# Patient Record
Sex: FEMALE | Race: BLACK OR AFRICAN AMERICAN | NOT HISPANIC OR LATINO | Employment: UNEMPLOYED | URBAN - METROPOLITAN AREA
[De-identification: names, ages, dates, MRNs, and addresses within clinical notes are randomized per-mention and may not be internally consistent; named-entity substitution may affect disease eponyms.]

---

## 2024-03-07 ENCOUNTER — HOSPITAL ENCOUNTER (EMERGENCY)
Facility: CLINIC | Age: 44
Discharge: HOME | End: 2024-03-07
Attending: EMERGENCY MEDICINE | Admitting: EMERGENCY MEDICINE
Payer: COMMERCIAL

## 2024-03-07 VITALS
BODY MASS INDEX: 41.04 KG/M2 | RESPIRATION RATE: 16 BRPM | WEIGHT: 223 LBS | OXYGEN SATURATION: 98 % | DIASTOLIC BLOOD PRESSURE: 82 MMHG | TEMPERATURE: 98.8 F | HEART RATE: 98 BPM | SYSTOLIC BLOOD PRESSURE: 123 MMHG | HEIGHT: 62 IN

## 2024-03-07 DIAGNOSIS — J02.0 STREP PHARYNGITIS: ICD-10-CM

## 2024-03-07 LAB
FLUAV RNA SPEC QL NAA+PROBE: NEGATIVE
FLUBV RNA RESP QL NAA+PROBE: NEGATIVE
GROUP A STREP BY PCR: DETECTED
RSV RNA SPEC NAA+PROBE: NEGATIVE
SARS-COV-2 RNA RESP QL NAA+PROBE: NEGATIVE

## 2024-03-07 PROCEDURE — 87651 STREP A DNA AMP PROBE: CPT | Performed by: EMERGENCY MEDICINE

## 2024-03-07 PROCEDURE — 250N000013 HC RX MED GY IP 250 OP 250 PS 637: Performed by: EMERGENCY MEDICINE

## 2024-03-07 PROCEDURE — 250N000012 HC RX MED GY IP 250 OP 636 PS 637: Performed by: EMERGENCY MEDICINE

## 2024-03-07 PROCEDURE — 99283 EMERGENCY DEPT VISIT LOW MDM: CPT

## 2024-03-07 PROCEDURE — 87637 SARSCOV2&INF A&B&RSV AMP PRB: CPT | Performed by: EMERGENCY MEDICINE

## 2024-03-07 RX ORDER — PENICILLIN V POTASSIUM 500 MG/1
500 TABLET, FILM COATED ORAL 2 TIMES DAILY
Qty: 20 TABLET | Refills: 0 | Status: SHIPPED | OUTPATIENT
Start: 2024-03-07 | End: 2024-03-17

## 2024-03-07 RX ORDER — IBUPROFEN 600 MG/1
600 TABLET, FILM COATED ORAL ONCE
Status: COMPLETED | OUTPATIENT
Start: 2024-03-07 | End: 2024-03-07

## 2024-03-07 RX ADMIN — IBUPROFEN 600 MG: 600 TABLET ORAL at 05:43

## 2024-03-07 RX ADMIN — DEXAMETHASONE 10 MG: 2 TABLET ORAL at 05:43

## 2024-03-07 ASSESSMENT — ACTIVITIES OF DAILY LIVING (ADL)
ADLS_ACUITY_SCORE: 33
ADLS_ACUITY_SCORE: 33

## 2024-03-07 ASSESSMENT — COLUMBIA-SUICIDE SEVERITY RATING SCALE - C-SSRS
2. HAVE YOU ACTUALLY HAD ANY THOUGHTS OF KILLING YOURSELF IN THE PAST MONTH?: NO
1. IN THE PAST MONTH, HAVE YOU WISHED YOU WERE DEAD OR WISHED YOU COULD GO TO SLEEP AND NOT WAKE UP?: NO
6. HAVE YOU EVER DONE ANYTHING, STARTED TO DO ANYTHING, OR PREPARED TO DO ANYTHING TO END YOUR LIFE?: NO

## 2024-03-07 NOTE — ED PROVIDER NOTES
"  History     Chief Complaint:  Throat Pain       HPI   Yesy Cheng is a 43 year old female who presents to the ED with a 2-day history of sore throat.  Per her report nobody is currently sick at home.  The patient has had a slight runny nose.  No significant cough.  No body ache or chills.  No skin rash.  She complains of pain with swallowing.      Independent Historian:   None    Review of External Notes:  None    Allergies:  No Known Allergies     Listed Medications:    penicillin V (VEETID) 500 MG tablet        Past Medical and Surgical History:    No past medical history on file.  No past surgical history on file.     Family History:    family history is not on file.    Social History:         Physical Exam   Patient Vitals for the past 24 hrs:   BP Temp Temp src Pulse Resp SpO2 Height Weight   03/07/24 0315 123/82 98.8  F (37.1  C) Oral 98 16 98 % 1.575 m (5' 2\") 101.2 kg (223 lb)      General: Resting on the gurney  Head:  The scalp, face, and head appear normal  Eyes:  The pupils are equal, round, and reactive to light    There is no nystagmus    Extraocular muscles are intact    Conjunctivae and sclerae are normal  ENT:    The nose is normal    Pinnae are normal    There is erythema involving the palatoglossal arches    There are bilateral exudates involving the tonsils    The Uvula is noted to be in the midline    There is no evidence of Peritonsillar Abscess    There is no Aly's Angina.  The sublingual space is normal  Neck:  Normal range of motion    There is no nuchal rigidity noted    There is no midline cervical spine pain/tenderness  Skin:  No rash or acute skin lesions noted  Neuro:  Speech is normal and fluent.  Lymph: There is mild anterior cervical lymphadenopathy noted    There is no posterior cervical lymphadenopathy          Emergency Department Course   E       Reports in this section have been read by the radiologist.    Laboratory:  Labs Ordered and Resulted from Time of ED Arrival " to Time of ED Departure   GROUP A STREPTOCOCCUS PCR THROAT SWAB - Abnormal       Result Value    Group A strep by PCR Detected (*)    INFLUENZA A/B, RSV, & SARS-COV2 PCR - Normal    Influenza A PCR Negative      Influenza B PCR Negative      RSV PCR Negative      SARS CoV2 PCR Negative          Procedures:  Procedures       Emergency Department Course & Assessments:             Interventions/ED Medications:  Medications   dexAMETHasone (DECADRON) tablet 10 mg (has no administration in time range)   ibuprofen (ADVIL/MOTRIN) tablet 600 mg (has no administration in time range)          Independent Interpretation of Radiology Studies by Dr. Matute      Assessments/Consultations/Discussion of Management:         Disposition:  Home    Impression & Plan        Medical Decision Making:  This patient presents to the emergency department with a 2-day history of sore throat.  She had viral PCR testing for COVID, influenza, and RSV which was negative.  Strep testing was positive in the emergency department.  Patient does have a bilateral exudative tonsillopharyngitis.  Uvula is in the midline and there is no evidence of peritonsillar abscess or other suppurative complications at this time.  Patient is given a single dose of dexamethasone to help with the inflammation and pain, she will be placed on penicillin for 10 days.      Diagnosis:    ICD-10-CM    1. Strep pharyngitis  J02.0              Discharge Medications (if applicable):  New Prescriptions    PENICILLIN V (VEETID) 500 MG TABLET    Take 1 tablet (500 mg) by mouth 2 times daily for 10 days          Cash Matute MD  3/7/2024   Cash Matute MD Rock, Michael P, MD  03/07/24 0565

## 2024-03-07 NOTE — DISCHARGE INSTRUCTIONS
Take your penicillin twice a day for 10 days  Take over-the-counter ibuprofen 4 to 600 mg (2 to 3 tablets) 3 times a day with food to help with pain  May also take extra strength Tylenol 1000 mg every 6 hours as needed for pain

## 2024-03-07 NOTE — ED TRIAGE NOTES
Patient states sore throat; started a couple of days ago.  Tonight it is more painful.  Fever, chills.  Has not used a thermometer.   Unable to sleep tonight due to pain.

## 2025-04-02 ENCOUNTER — HOSPITAL ENCOUNTER (EMERGENCY)
Facility: CLINIC | Age: 45
Discharge: HOME | End: 2025-04-02
Attending: EMERGENCY MEDICINE | Admitting: EMERGENCY MEDICINE
Payer: COMMERCIAL

## 2025-04-02 VITALS
TEMPERATURE: 98 F | BODY MASS INDEX: 33.61 KG/M2 | SYSTOLIC BLOOD PRESSURE: 114 MMHG | OXYGEN SATURATION: 99 % | HEART RATE: 70 BPM | HEIGHT: 61 IN | DIASTOLIC BLOOD PRESSURE: 83 MMHG | WEIGHT: 178 LBS | RESPIRATION RATE: 15 BRPM

## 2025-04-02 DIAGNOSIS — R07.9 ACUTE CHEST PAIN: ICD-10-CM

## 2025-04-02 DIAGNOSIS — S29.012A STRAIN OF THORACIC BACK REGION: ICD-10-CM

## 2025-04-02 LAB
ALBUMIN SERPL BCG-MCNC: 4.2 G/DL (ref 3.5–5.2)
ALP SERPL-CCNC: 64 U/L (ref 40–150)
ALT SERPL W P-5'-P-CCNC: 11 U/L (ref 0–50)
ANION GAP SERPL CALCULATED.3IONS-SCNC: 12 MMOL/L (ref 7–15)
AST SERPL W P-5'-P-CCNC: 18 U/L (ref 0–45)
ATRIAL RATE - MUSE: 88 BPM
BASOPHILS # BLD AUTO: 0 10E3/UL (ref 0–0.2)
BASOPHILS NFR BLD AUTO: 0 %
BILIRUB SERPL-MCNC: 0.2 MG/DL
BUN SERPL-MCNC: 12.3 MG/DL (ref 6–20)
CALCIUM SERPL-MCNC: 9.2 MG/DL (ref 8.8–10.4)
CHLORIDE SERPL-SCNC: 101 MMOL/L (ref 98–107)
CREAT SERPL-MCNC: 0.71 MG/DL (ref 0.51–0.95)
D DIMER PPP FEU-MCNC: 0.33 UG/ML FEU (ref 0–0.5)
DIASTOLIC BLOOD PRESSURE - MUSE: NORMAL MMHG
EGFRCR SERPLBLD CKD-EPI 2021: >90 ML/MIN/1.73M2
EOSINOPHIL # BLD AUTO: 0.2 10E3/UL (ref 0–0.7)
EOSINOPHIL NFR BLD AUTO: 4 %
ERYTHROCYTE [DISTWIDTH] IN BLOOD BY AUTOMATED COUNT: 17.2 % (ref 10–15)
GLUCOSE SERPL-MCNC: 81 MG/DL (ref 70–99)
HCG SERPL QL: NEGATIVE
HCO3 SERPL-SCNC: 23 MMOL/L (ref 22–29)
HCT VFR BLD AUTO: 33 % (ref 35–47)
HGB BLD-MCNC: 10.7 G/DL (ref 11.7–15.7)
IMM GRANULOCYTES # BLD: 0 10E3/UL
IMM GRANULOCYTES NFR BLD: 0 %
INTERPRETATION ECG - MUSE: NORMAL
LIPASE SERPL-CCNC: 45 U/L (ref 13–60)
LYMPHOCYTES # BLD AUTO: 2.2 10E3/UL (ref 0.8–5.3)
LYMPHOCYTES NFR BLD AUTO: 37 %
MCH RBC QN AUTO: 25.4 PG (ref 26.5–33)
MCHC RBC AUTO-ENTMCNC: 32.4 G/DL (ref 31.5–36.5)
MCV RBC AUTO: 78 FL (ref 78–100)
MONOCYTES # BLD AUTO: 0.4 10E3/UL (ref 0–1.3)
MONOCYTES NFR BLD AUTO: 7 %
NEUTROPHILS # BLD AUTO: 3.1 10E3/UL (ref 1.6–8.3)
NEUTROPHILS NFR BLD AUTO: 52 %
NRBC # BLD AUTO: 0 10E3/UL
NRBC BLD AUTO-RTO: 0 /100
P AXIS - MUSE: 62 DEGREES
PLATELET # BLD AUTO: 352 10E3/UL (ref 150–450)
POTASSIUM SERPL-SCNC: 4.3 MMOL/L (ref 3.4–5.3)
PR INTERVAL - MUSE: 148 MS
PROT SERPL-MCNC: 7.6 G/DL (ref 6.4–8.3)
QRS DURATION - MUSE: 78 MS
QT - MUSE: 362 MS
QTC - MUSE: 438 MS
R AXIS - MUSE: 23 DEGREES
RBC # BLD AUTO: 4.22 10E6/UL (ref 3.8–5.2)
SODIUM SERPL-SCNC: 136 MMOL/L (ref 135–145)
SYSTOLIC BLOOD PRESSURE - MUSE: NORMAL MMHG
T AXIS - MUSE: -24 DEGREES
TROPONIN T SERPL HS-MCNC: <6 NG/L
VENTRICULAR RATE- MUSE: 88 BPM
WBC # BLD AUTO: 5.9 10E3/UL (ref 4–11)

## 2025-04-02 PROCEDURE — 85025 COMPLETE CBC W/AUTO DIFF WBC: CPT | Performed by: EMERGENCY MEDICINE

## 2025-04-02 PROCEDURE — 84703 CHORIONIC GONADOTROPIN ASSAY: CPT | Performed by: EMERGENCY MEDICINE

## 2025-04-02 PROCEDURE — 84484 ASSAY OF TROPONIN QUANT: CPT | Performed by: EMERGENCY MEDICINE

## 2025-04-02 PROCEDURE — 250N000013 HC RX MED GY IP 250 OP 250 PS 637: Performed by: EMERGENCY MEDICINE

## 2025-04-02 PROCEDURE — 84075 ASSAY ALKALINE PHOSPHATASE: CPT | Performed by: EMERGENCY MEDICINE

## 2025-04-02 PROCEDURE — 82247 BILIRUBIN TOTAL: CPT | Performed by: EMERGENCY MEDICINE

## 2025-04-02 PROCEDURE — 93005 ELECTROCARDIOGRAM TRACING: CPT

## 2025-04-02 PROCEDURE — 96375 TX/PRO/DX INJ NEW DRUG ADDON: CPT

## 2025-04-02 PROCEDURE — 36415 COLL VENOUS BLD VENIPUNCTURE: CPT | Performed by: EMERGENCY MEDICINE

## 2025-04-02 PROCEDURE — 85379 FIBRIN DEGRADATION QUANT: CPT | Performed by: EMERGENCY MEDICINE

## 2025-04-02 PROCEDURE — 85014 HEMATOCRIT: CPT | Performed by: EMERGENCY MEDICINE

## 2025-04-02 PROCEDURE — 82435 ASSAY OF BLOOD CHLORIDE: CPT | Performed by: EMERGENCY MEDICINE

## 2025-04-02 PROCEDURE — 250N000011 HC RX IP 250 OP 636: Mod: JZ | Performed by: EMERGENCY MEDICINE

## 2025-04-02 PROCEDURE — 84155 ASSAY OF PROTEIN SERUM: CPT | Performed by: EMERGENCY MEDICINE

## 2025-04-02 PROCEDURE — 83690 ASSAY OF LIPASE: CPT | Performed by: EMERGENCY MEDICINE

## 2025-04-02 PROCEDURE — 99285 EMERGENCY DEPT VISIT HI MDM: CPT | Mod: 25

## 2025-04-02 PROCEDURE — 96374 THER/PROPH/DIAG INJ IV PUSH: CPT

## 2025-04-02 RX ORDER — LIDOCAINE 4 G/G
2 PATCH TOPICAL ONCE
Status: DISCONTINUED | OUTPATIENT
Start: 2025-04-02 | End: 2025-04-02 | Stop reason: HOSPADM

## 2025-04-02 RX ORDER — HYDROMORPHONE HYDROCHLORIDE 1 MG/ML
0.5 INJECTION, SOLUTION INTRAMUSCULAR; INTRAVENOUS; SUBCUTANEOUS EVERY 30 MIN PRN
Status: DISCONTINUED | OUTPATIENT
Start: 2025-04-02 | End: 2025-04-02 | Stop reason: HOSPADM

## 2025-04-02 RX ORDER — LIDOCAINE 50 MG/G
2 PATCH TOPICAL EVERY 24 HOURS
Qty: 30 PATCH | Refills: 0 | Status: SHIPPED | OUTPATIENT
Start: 2025-04-02

## 2025-04-02 RX ORDER — KETOROLAC TROMETHAMINE 15 MG/ML
15 INJECTION, SOLUTION INTRAMUSCULAR; INTRAVENOUS ONCE
Status: COMPLETED | OUTPATIENT
Start: 2025-04-02 | End: 2025-04-02

## 2025-04-02 RX ORDER — IBUPROFEN 600 MG/1
600 TABLET, FILM COATED ORAL EVERY 6 HOURS PRN
Qty: 20 TABLET | Refills: 0 | Status: SHIPPED | OUTPATIENT
Start: 2025-04-02

## 2025-04-02 RX ORDER — DIAZEPAM 5 MG/1
5 TABLET ORAL ONCE
Status: COMPLETED | OUTPATIENT
Start: 2025-04-02 | End: 2025-04-02

## 2025-04-02 RX ADMIN — LIDOCAINE 2 PATCH: 4 PATCH TOPICAL at 19:35

## 2025-04-02 RX ADMIN — DIAZEPAM 5 MG: 5 TABLET ORAL at 18:19

## 2025-04-02 RX ADMIN — HYDROMORPHONE HYDROCHLORIDE 0.5 MG: 1 INJECTION, SOLUTION INTRAMUSCULAR; INTRAVENOUS; SUBCUTANEOUS at 18:28

## 2025-04-02 RX ADMIN — KETOROLAC TROMETHAMINE 15 MG: 15 INJECTION, SOLUTION INTRAMUSCULAR; INTRAVENOUS at 18:19

## 2025-04-02 ASSESSMENT — ACTIVITIES OF DAILY LIVING (ADL)
ADLS_ACUITY_SCORE: 41
ADLS_ACUITY_SCORE: 41

## 2025-04-02 ASSESSMENT — COLUMBIA-SUICIDE SEVERITY RATING SCALE - C-SSRS
6. HAVE YOU EVER DONE ANYTHING, STARTED TO DO ANYTHING, OR PREPARED TO DO ANYTHING TO END YOUR LIFE?: NO
2. HAVE YOU ACTUALLY HAD ANY THOUGHTS OF KILLING YOURSELF IN THE PAST MONTH?: NO
1. IN THE PAST MONTH, HAVE YOU WISHED YOU WERE DEAD OR WISHED YOU COULD GO TO SLEEP AND NOT WAKE UP?: NO

## 2025-04-02 NOTE — ED PROVIDER NOTES
"  Emergency Department Note      History of Present Illness     Chief Complaint   Chest Pain and Back Pain      HPI   Yesy Cheng is a 44 year old female with history of asthma who presents to the ED for chest pain and back pain. The patient reports that she was cleaning her bathroom on all fours around 1100 this morning. When she went to get up, she developed chest pain which radiates through to the back. She describes her back pain as a sharp sensation in the right thoracic region which radiates through to the midsternal chest. Her pain is worsened by standing up and moving. The patient has never experienced pain like this in the past. She also endorses some pain in her legs when she moves and shortness of breath due to her back pain. She believes her pain was due to pulling a muscle while getting up. The patient is noted to have lost a lot of weight recently. She is currently taking zepbound. Denies her pain being caused by injury or trauma. Denies nausea, vomiting, abdominal pain, sore throat, cough, and fever. No history of DVT or pulmonary embolism. The patient hasn't done any long distance travel recently.     Independent Historian   None    Review of External Notes   none    Past Medical History     Medical History and Problem List   Asthma   Anemia   Morbid obesity     Medications   Zepbound     Surgical History    section      Physical Exam     Patient Vitals for the past 24 hrs:   BP Temp Temp src Pulse Resp SpO2 Height Weight   25 1930 114/83 -- -- 70 -- 99 % -- --   25 1800 -- -- -- 88 15 100 % -- --   25 1732 112/81 98  F (36.7  C) Temporal 97 20 100 % 1.549 m (5' 1\") 80.7 kg (178 lb)     Physical Exam  Nursing note and vitals reviewed.  Constitutional:  Appears to be in severe pain which is worse with standing and moving. Well-developed and well-nourished.   HENT:   Head:    Atraumatic.   Mouth/Throat:   Oropharynx is clear and moist. No oropharyngeal exudate.   Eyes: "    Pupils are equal, round, and reactive to light.   Neck:    Normal range of motion. Neck supple.      No tracheal deviation present. No thyromegaly present.   Cardiovascular:  Normal rate, regular rhythm, no murmur   Pulmonary/Chest: Breath sounds are clear and equal without wheezes or crackles. Non tender chest wall.  Abdominal:   Soft. Bowel sounds are normal. Exhibits no distension and      no mass. There is no tenderness.      There is no rebound and no guarding.   Back: Tenderness to the musculature of her back in the right throacic region overlying the right scapula without any swelling. Non tender midline over the thoracic and lumbar spine.  Musculoskeletal:  Exhibits no edema.   Lymphadenopathy:  No cervical adenopathy.   Neurological:   Alert and oriented to person, place, and time. GCS 15.  CN 2-12 intact.  and proximal upper extremity strength strong and equal.  Bilateral lower extremity strength strong and equal, including strong dorsiflexion and plantarflexion strength.  Sensation intact and equal to the face, arms and legs.  No facial droop or weakness. Normal speech.  Follows commands and answers questions normally.    Skin:    Skin is warm and dry. No rash noted. No pallor.       Diagnostics     Lab Results   Labs Ordered and Resulted from Time of ED Arrival to Time of ED Departure   CBC WITH PLATELETS AND DIFFERENTIAL - Abnormal       Result Value    WBC Count 5.9      RBC Count 4.22      Hemoglobin 10.7 (*)     Hematocrit 33.0 (*)     MCV 78      MCH 25.4 (*)     MCHC 32.4      RDW 17.2 (*)     Platelet Count 352      % Neutrophils 52      % Lymphocytes 37      % Monocytes 7      % Eosinophils 4      % Basophils 0      % Immature Granulocytes 0      NRBCs per 100 WBC 0      Absolute Neutrophils 3.1      Absolute Lymphocytes 2.2      Absolute Monocytes 0.4      Absolute Eosinophils 0.2      Absolute Basophils 0.0      Absolute Immature Granulocytes 0.0      Absolute NRBCs 0.0     D DIMER  QUANTITATIVE - Normal    D-Dimer Quantitative 0.33     COMPREHENSIVE METABOLIC PANEL - Normal    Sodium 136      Potassium 4.3      Carbon Dioxide (CO2) 23      Anion Gap 12      Urea Nitrogen 12.3      Creatinine 0.71      GFR Estimate >90      Calcium 9.2      Chloride 101      Glucose 81      Alkaline Phosphatase 64      AST 18      ALT 11      Protein Total 7.6      Albumin 4.2      Bilirubin Total 0.2     TROPONIN T, HIGH SENSITIVITY - Normal    Troponin T, High Sensitivity <6     LIPASE - Normal    Lipase 45     HCG QUALITATIVE PREGNANCY - Normal    hCG Serum Qualitative Negative         Imaging   Chest XR,  PA & LAT   Final Result   IMPRESSION: Tiny calcified granuloma right lung apex laterally of no significance. Chest otherwise negative. No pneumothorax.          EKG   ECG taken at 1735, ECG read at 1739  Normal sinus rhythm  Nonspecific T wave abnormality    Rate 88 bpm. LA interval 148 ms. QRS duration 78 ms. QT/QTc 362/438 ms. P-R-T axes 62 23 -24.    Independent Interpretation   CXR: No pneumothorax, infiltrate, pleural effusion, pulmonary edema, cardiomegaly, or mediastinal widening.    ED Course      Medications Administered   Medications   HYDROmorphone (PF) (DILAUDID) injection 0.5 mg (0.5 mg Intravenous $Given 4/2/25 1828)   Lidocaine (LIDOCARE) 4 % Patch 2 patch (2 patches Transdermal $Patch/Med Applied 4/2/25 1935)   ketorolac (TORADOL) injection 15 mg (15 mg Intravenous $Given 4/2/25 1819)   diazepam (VALIUM) tablet 5 mg (5 mg Oral $Given 4/2/25 1819)       Procedures   Procedures     Discussion of Management   None    ED Course   ED Course as of 04/03/25 0129 Wed Apr 02, 2025   1740 I obtained history and performed physical exam.    1922 I rechecked the patient. She is feeling a lot better.   1944 I updated the patient and prepared her for discharge.        Additional Documentation  None    Medical Decision Making / Diagnosis     CMS Diagnoses: None    MIPS       None    [facility]   Yesy Conn  Prosper is a 44 year old female who arrives to the emergency department due to right-sided thoracic back pain which is worse with movement which began while she was scrubbing the floors cleaning a bathroom earlier today.  I initially considered the possibly of pulmonary embolism however her D-dimer is normal and she is not hypoxic or tachycardic and the chest pain is not pleuritic and she is not hypoxic or tachycardic, so ultimately I did not feel there was any sign of pulmonary embolism.  There was no sign of aortic dissection.  Her D-dimer is negative and mediastinum is normal on chest x-ray imaging as well as the fact that she is not hypertensive and her pain is worse with palpation and movement making aortic dissection very unlikely.  There was no sign of acute myocardial infarction.  Her ECG is unremarkable without any sign of acute ischemia or arrhythmia.  Troponin was undetectable.  She also does not have any sign of spinal cord abnormality since she is neurologically intact and normal here and her pain is significantly improved after the above treatments.  There was no sign of herpes zoster.  There was no sign of pneumonia, pneumothorax or pleural effusion.  I feel that her pain is due to musculoskeletal pain with muscle spasm due to muscle sprain.  She is prescribed muscle relaxer, lidocaine patches and prescription strength ibuprofen.  She was given a work note for up to 4 days off work.  She was told to follow-up with her primary care doctor within the next 1 to 2 days for recheck.  She was told to return if symptoms worsen or for any numbness or weakness.  She was told not to drive a car or drink alcohol for 8 hours after taking the muscle relaxer.    Disposition   The patient was discharged.     Diagnosis     ICD-10-CM    1. Strain of thoracic back region  S29.012A       2. Acute chest pain  R07.9            Discharge Medications   New Prescriptions    IBUPROFEN (ADVIL/MOTRIN) 600 MG TABLET    Take 1  tablet (600 mg) by mouth every 6 hours as needed for moderate pain. (Take with food)    LIDOCAINE (LIDODERM) 5 % PATCH    Place 2 patches over 12 hours onto the skin every 24 hours. To prevent lidocaine toxicity, patient should be patch free for 12 hrs daily.    TIZANIDINE (ZANAFLEX) 4 MG TABLET    Take 1 tablet (4 mg) by mouth 3 times daily as needed for muscle spasms (Back pain). No driving a car or drinking alcohol for 8 hours after taking this medication.         Scribe Disclosure:  IMarleni, am serving as a scribe at 7:00 PM on 4/2/2025 to document services personally performed by Heike Dye MD based on my observations and the provider's statements to me.        Heike Dye MD  04/03/25 0134       Heike Dye MD  04/03/25 0134

## 2025-04-02 NOTE — ED TRIAGE NOTES
"Pt arrives with family with \"shooting, stabbing\" back pain radiating to R anterior chest and SOB. \"It feels like somebody is squeezing my chest\". Pain onset 1100 today. Pt reports the pain is the worst when \"blowing my nose or coughing\", but at rest 8/10. Denies n/v.         "

## 2025-04-02 NOTE — LETTER
April 2, 2025      To Whom It May Concern:      Yesy Cheng was seen in our Emergency Department today, 04/02/25.  I expect her condition to improve over the next 4 days.  She may return to work/school when improved.  Please excuse her from work for the next 4 days as needed.    Sincerely,        Heike Dye MD  Electronically signed